# Patient Record
Sex: MALE | Race: BLACK OR AFRICAN AMERICAN | Employment: OTHER | URBAN - METROPOLITAN AREA
[De-identification: names, ages, dates, MRNs, and addresses within clinical notes are randomized per-mention and may not be internally consistent; named-entity substitution may affect disease eponyms.]

---

## 2018-03-07 ENCOUNTER — ANESTHESIA EVENT (OUTPATIENT)
Dept: SURGERY | Age: 73
End: 2018-03-07
Payer: MEDICARE

## 2018-03-08 ENCOUNTER — HOSPITAL ENCOUNTER (OUTPATIENT)
Age: 73
Setting detail: OUTPATIENT SURGERY
Discharge: HOME | End: 2018-03-08
Attending: UROLOGY | Admitting: UROLOGY
Payer: MEDICARE

## 2018-03-08 ENCOUNTER — ANESTHESIA (OUTPATIENT)
Dept: SURGERY | Age: 73
End: 2018-03-08
Payer: MEDICARE

## 2018-03-08 VITALS
OXYGEN SATURATION: 97 % | SYSTOLIC BLOOD PRESSURE: 149 MMHG | TEMPERATURE: 98.2 F | HEIGHT: 70 IN | BODY MASS INDEX: 32.21 KG/M2 | WEIGHT: 225 LBS | RESPIRATION RATE: 20 BRPM | HEART RATE: 72 BPM | DIASTOLIC BLOOD PRESSURE: 88 MMHG

## 2018-03-08 LAB
GLUCOSE BLD STRIP.AUTO-MCNC: 137 MG/DL (ref 70–110)
GLUCOSE BLD STRIP.AUTO-MCNC: 140 MG/DL (ref 70–110)

## 2018-03-08 PROCEDURE — 74011250636 HC RX REV CODE- 250/636: Performed by: NURSE ANESTHETIST, CERTIFIED REGISTERED

## 2018-03-08 PROCEDURE — 76060000034 HC ANESTHESIA 1.5 TO 2 HR: Performed by: UROLOGY

## 2018-03-08 PROCEDURE — 74011250637 HC RX REV CODE- 250/637: Performed by: NURSE ANESTHETIST, CERTIFIED REGISTERED

## 2018-03-08 PROCEDURE — 76210000006 HC OR PH I REC 0.5 TO 1 HR: Performed by: UROLOGY

## 2018-03-08 PROCEDURE — 77030010545: Performed by: UROLOGY

## 2018-03-08 PROCEDURE — 74011250636 HC RX REV CODE- 250/636

## 2018-03-08 PROCEDURE — 77030012884 HC SLV COMPR SCD MTEK -B: Performed by: UROLOGY

## 2018-03-08 PROCEDURE — 76010000162 HC OR TIME 1.5 TO 2 HR INTENSV-TIER 1: Performed by: UROLOGY

## 2018-03-08 PROCEDURE — 88305 TISSUE EXAM BY PATHOLOGIST: CPT | Performed by: UROLOGY

## 2018-03-08 PROCEDURE — 82962 GLUCOSE BLOOD TEST: CPT

## 2018-03-08 PROCEDURE — 77030020849 HC CATH URETH FOL 3 WAY  BARD -B: Performed by: UROLOGY

## 2018-03-08 PROCEDURE — 77030018836 HC SOL IRR NACL ICUM -A: Performed by: UROLOGY

## 2018-03-08 PROCEDURE — 76210000021 HC REC RM PH II 0.5 TO 1 HR: Performed by: UROLOGY

## 2018-03-08 PROCEDURE — 77030013079 HC BLNKT BAIR HGGR 3M -A: Performed by: ANESTHESIOLOGY

## 2018-03-08 PROCEDURE — 74011250636 HC RX REV CODE- 250/636: Performed by: UROLOGY

## 2018-03-08 PROCEDURE — C1782 MORCELLATOR: HCPCS | Performed by: UROLOGY

## 2018-03-08 PROCEDURE — 77030018846 HC SOL IRR STRL H20 ICUM -A: Performed by: UROLOGY

## 2018-03-08 PROCEDURE — 74011000250 HC RX REV CODE- 250

## 2018-03-08 PROCEDURE — 77030012510 HC MSK AIRWY LMA TELE -B: Performed by: ANESTHESIOLOGY

## 2018-03-08 RX ORDER — HYDROCODONE BITARTRATE AND ACETAMINOPHEN 5; 325 MG/1; MG/1
1 TABLET ORAL ONCE
Status: DISCONTINUED | OUTPATIENT
Start: 2018-03-08 | End: 2018-03-08 | Stop reason: HOSPADM

## 2018-03-08 RX ORDER — PROPOFOL 10 MG/ML
INJECTION, EMULSION INTRAVENOUS AS NEEDED
Status: DISCONTINUED | OUTPATIENT
Start: 2018-03-08 | End: 2018-03-08 | Stop reason: HOSPADM

## 2018-03-08 RX ORDER — MIDAZOLAM HYDROCHLORIDE 1 MG/ML
INJECTION, SOLUTION INTRAMUSCULAR; INTRAVENOUS AS NEEDED
Status: DISCONTINUED | OUTPATIENT
Start: 2018-03-08 | End: 2018-03-08 | Stop reason: HOSPADM

## 2018-03-08 RX ORDER — SODIUM CHLORIDE, SODIUM LACTATE, POTASSIUM CHLORIDE, CALCIUM CHLORIDE 600; 310; 30; 20 MG/100ML; MG/100ML; MG/100ML; MG/100ML
75 INJECTION, SOLUTION INTRAVENOUS CONTINUOUS
Status: DISCONTINUED | OUTPATIENT
Start: 2018-03-08 | End: 2018-03-08 | Stop reason: HOSPADM

## 2018-03-08 RX ORDER — OXYCODONE AND ACETAMINOPHEN 5; 325 MG/1; MG/1
1-2 TABLET ORAL
Qty: 30 TAB | Refills: 0 | Status: SHIPPED | OUTPATIENT
Start: 2018-03-08 | End: 2018-06-01

## 2018-03-08 RX ORDER — DEXTROSE 50 % IN WATER (D50W) INTRAVENOUS SYRINGE
25-50 AS NEEDED
Status: DISCONTINUED | OUTPATIENT
Start: 2018-03-08 | End: 2018-03-08 | Stop reason: HOSPADM

## 2018-03-08 RX ORDER — MAGNESIUM SULFATE 100 %
4 CRYSTALS MISCELLANEOUS AS NEEDED
Status: DISCONTINUED | OUTPATIENT
Start: 2018-03-08 | End: 2018-03-08 | Stop reason: HOSPADM

## 2018-03-08 RX ORDER — HYDROMORPHONE HYDROCHLORIDE 2 MG/ML
0.5 INJECTION, SOLUTION INTRAMUSCULAR; INTRAVENOUS; SUBCUTANEOUS
Status: DISCONTINUED | OUTPATIENT
Start: 2018-03-08 | End: 2018-03-08 | Stop reason: HOSPADM

## 2018-03-08 RX ORDER — CEFAZOLIN SODIUM 2 G/50ML
2 SOLUTION INTRAVENOUS
Status: COMPLETED | OUTPATIENT
Start: 2018-03-08 | End: 2018-03-08

## 2018-03-08 RX ORDER — ONDANSETRON 2 MG/ML
INJECTION INTRAMUSCULAR; INTRAVENOUS AS NEEDED
Status: DISCONTINUED | OUTPATIENT
Start: 2018-03-08 | End: 2018-03-08 | Stop reason: HOSPADM

## 2018-03-08 RX ORDER — FENTANYL CITRATE 50 UG/ML
50 INJECTION, SOLUTION INTRAMUSCULAR; INTRAVENOUS AS NEEDED
Status: DISCONTINUED | OUTPATIENT
Start: 2018-03-08 | End: 2018-03-08 | Stop reason: HOSPADM

## 2018-03-08 RX ORDER — DOCUSATE SODIUM 100 MG/1
100 CAPSULE, LIQUID FILLED ORAL
Qty: 60 CAP | Refills: 2 | Status: SHIPPED | OUTPATIENT
Start: 2018-03-08 | End: 2018-06-01

## 2018-03-08 RX ORDER — LIDOCAINE HYDROCHLORIDE 10 MG/ML
0.1 INJECTION INFILTRATION; PERINEURAL AS NEEDED
Status: DISCONTINUED | OUTPATIENT
Start: 2018-03-08 | End: 2018-03-08 | Stop reason: HOSPADM

## 2018-03-08 RX ORDER — FAMOTIDINE 20 MG/1
20 TABLET, FILM COATED ORAL ONCE
Status: COMPLETED | OUTPATIENT
Start: 2018-03-08 | End: 2018-03-08

## 2018-03-08 RX ORDER — FENTANYL CITRATE 50 UG/ML
INJECTION, SOLUTION INTRAMUSCULAR; INTRAVENOUS AS NEEDED
Status: DISCONTINUED | OUTPATIENT
Start: 2018-03-08 | End: 2018-03-08 | Stop reason: HOSPADM

## 2018-03-08 RX ORDER — DIPHENHYDRAMINE HYDROCHLORIDE 50 MG/ML
25 INJECTION, SOLUTION INTRAMUSCULAR; INTRAVENOUS
Status: DISCONTINUED | OUTPATIENT
Start: 2018-03-08 | End: 2018-03-08 | Stop reason: HOSPADM

## 2018-03-08 RX ORDER — CIPROFLOXACIN 500 MG/1
500 TABLET ORAL 2 TIMES DAILY
Qty: 6 TAB | Refills: 0 | Status: SHIPPED | OUTPATIENT
Start: 2018-03-08 | End: 2018-06-01

## 2018-03-08 RX ORDER — INSULIN LISPRO 100 [IU]/ML
INJECTION, SOLUTION INTRAVENOUS; SUBCUTANEOUS ONCE
Status: DISCONTINUED | OUTPATIENT
Start: 2018-03-08 | End: 2018-03-08 | Stop reason: HOSPADM

## 2018-03-08 RX ORDER — LIDOCAINE HYDROCHLORIDE 20 MG/ML
INJECTION, SOLUTION EPIDURAL; INFILTRATION; INTRACAUDAL; PERINEURAL AS NEEDED
Status: DISCONTINUED | OUTPATIENT
Start: 2018-03-08 | End: 2018-03-08 | Stop reason: HOSPADM

## 2018-03-08 RX ORDER — DEXAMETHASONE SODIUM PHOSPHATE 4 MG/ML
INJECTION, SOLUTION INTRA-ARTICULAR; INTRALESIONAL; INTRAMUSCULAR; INTRAVENOUS; SOFT TISSUE AS NEEDED
Status: DISCONTINUED | OUTPATIENT
Start: 2018-03-08 | End: 2018-03-08 | Stop reason: HOSPADM

## 2018-03-08 RX ADMIN — DEXAMETHASONE SODIUM PHOSPHATE 4 MG: 4 INJECTION, SOLUTION INTRA-ARTICULAR; INTRALESIONAL; INTRAMUSCULAR; INTRAVENOUS; SOFT TISSUE at 08:53

## 2018-03-08 RX ADMIN — FENTANYL CITRATE 50 MCG: 50 INJECTION, SOLUTION INTRAMUSCULAR; INTRAVENOUS at 09:23

## 2018-03-08 RX ADMIN — PROPOFOL 150 MG: 10 INJECTION, EMULSION INTRAVENOUS at 08:44

## 2018-03-08 RX ADMIN — ONDANSETRON 4 MG: 2 INJECTION INTRAMUSCULAR; INTRAVENOUS at 10:00

## 2018-03-08 RX ADMIN — CEFAZOLIN SODIUM 2 G: 2 SOLUTION INTRAVENOUS at 08:44

## 2018-03-08 RX ADMIN — LIDOCAINE HYDROCHLORIDE 100 MG: 20 INJECTION, SOLUTION EPIDURAL; INFILTRATION; INTRACAUDAL; PERINEURAL at 08:44

## 2018-03-08 RX ADMIN — FAMOTIDINE 20 MG: 20 TABLET, FILM COATED ORAL at 07:30

## 2018-03-08 RX ADMIN — FENTANYL CITRATE 50 MCG: 50 INJECTION, SOLUTION INTRAMUSCULAR; INTRAVENOUS at 10:00

## 2018-03-08 RX ADMIN — MIDAZOLAM HYDROCHLORIDE 2 MG: 1 INJECTION, SOLUTION INTRAMUSCULAR; INTRAVENOUS at 08:28

## 2018-03-08 RX ADMIN — SODIUM CHLORIDE, SODIUM LACTATE, POTASSIUM CHLORIDE, AND CALCIUM CHLORIDE 75 ML/HR: 600; 310; 30; 20 INJECTION, SOLUTION INTRAVENOUS at 07:26

## 2018-03-08 RX ADMIN — FENTANYL CITRATE 100 MCG: 50 INJECTION, SOLUTION INTRAMUSCULAR; INTRAVENOUS at 08:36

## 2018-03-08 NOTE — DISCHARGE INSTRUCTIONS
Follow up 3/12/18 at 10:10am for Bowman catheter removal      DISCHARGE SUMMARY from Nurse    PATIENT INSTRUCTIONS:    After general anesthesia or intravenous sedation, for 24 hours or while taking prescription Narcotics:  · Limit your activities  · Do not drive and operate hazardous machinery  · Do not make important personal or business decisions  · Do  not drink alcoholic beverages  · If you have not urinated within 8 hours after discharge, please contact your surgeon on call. Report the following to your surgeon:  · Excessive pain, swelling, redness or odor of or around the surgical area  · Temperature over 100.5  · Nausea and vomiting lasting longer than 4 hours or if unable to take medications  · Any signs of decreased circulation or nerve impairment to extremity: change in color, persistent  numbness, tingling, coldness or increase pain  · Any questions    What to do at Home:  Recommended activity: Activity as tolerated and no driving for today. These are general instructions for a healthy lifestyle:    No smoking/ No tobacco products/ Avoid exposure to second hand smoke  Surgeon General's Warning:  Quitting smoking now greatly reduces serious risk to your health. Obesity, smoking, and sedentary lifestyle greatly increases your risk for illness    A healthy diet, regular physical exercise & weight monitoring are important for maintaining a healthy lifestyle    You may be retaining fluid if you have a history of heart failure or if you experience any of the following symptoms:  Weight gain of 3 pounds or more overnight or 5 pounds in a week, increased swelling in our hands or feet or shortness of breath while lying flat in bed. Please call your doctor as soon as you notice any of these symptoms; do not wait until your next office visit.     Recognize signs and symptoms of STROKE:    F-face looks uneven    A-arms unable to move or move unevenly    S-speech slurred or non-existent    T-time-call 911 as soon as signs and symptoms begin-DO NOT go       Back to bed or wait to see if you get better-TIME IS BRAIN. Warning Signs of HEART ATTACK     Call 911 if you have these symptoms:   Chest discomfort. Most heart attacks involve discomfort in the center of the chest that lasts more than a few minutes, or that goes away and comes back. It can feel like uncomfortable pressure, squeezing, fullness, or pain.  Discomfort in other areas of the upper body. Symptoms can include pain or discomfort in one or both arms, the back, neck, jaw, or stomach.  Shortness of breath with or without chest discomfort.  Other signs may include breaking out in a cold sweat, nausea, or lightheadedness. Don't wait more than five minutes to call 911 - MINUTES MATTER! Fast action can save your life. Calling 911 is almost always the fastest way to get lifesaving treatment. Emergency Medical Services staff can begin treatment when they arrive -- up to an hour sooner than if someone gets to the hospital by car. The discharge information has been reviewed with the patient. The patient verbalized understanding. Discharge medications reviewed with the patient and appropriate educational materials and side effects teaching were provided. _________  Patient armband removed and given to patient to take home.   Patient was informed of the privacy risks if armband lost or stolen  __________________________________________________________________________________________________________________________

## 2018-03-08 NOTE — IP AVS SNAPSHOT
[facility] 
 
 
 [address] 
[phone] Patient: Sushma Murray MRN: [id number] :1945 About your hospitalization You were [facility] on:  [facility] You were discharged on:  2018 Why you were hospitalized Your primary diagnosis was:  Not on File Follow-up Information Follow up With Details Comments Contact Info MD Kwame Quiroz [id number] [place] [address] 
[phone] Your Scheduled Appointments 2018 10:10 AM EDT Nurse Visit with [facility] ([address]) [address], [place] [address] [facility] [id number] [phone] 2018  2:45 PM EDT  
ESTABLISHED PATIENT with Marcio Emanuel MD  
Urology of [address]) [address], [place] [address] [facility] [address]  
[phone] Discharge Orders None A check ariadna indicates which time of day the medication should be taken. My Medications START taking these medications Instructions Each Dose to Equal  
 Morning Noon Evening Bedtime  
 ciprofloxacin HCl 500 mg tablet Commonly known as:  CIPRO Your last dose was: Your next dose is: Take 1 Tab by mouth two (2) times a day. 500 mg  
    
   
   
   
  
 docusate sodium 100 mg capsule Commonly known as:  Arlean Lavender Your last dose was: Your next dose is: Take 1 Cap by mouth two (2) times daily as needed for Constipation for up to 90 days. 100 mg  
    
   
   
   
  
 oxyCODONE-acetaminophen 5-325 mg per tablet Commonly known as:  PERCOCET Your last dose was: Your next dose is: Take 1-2 Tabs by mouth every four (4) hours as needed for Pain. Max Daily Amount: 12 Tabs. 1-2 Tab CONTINUE taking these medications Instructions Each Dose to Equal  
 Morning Noon Evening Bedtime  
 aspirin delayed-release 81 mg tablet Your last dose was: Your next dose is: Take 81 mg by mouth daily. 81 mg  
    
   
   
   
  
 atorvastatin 10 mg tablet Commonly known as:  LIPITOR Your last dose was: Your next dose is: Take 10 mg by mouth daily. Indications: hyperlipidemia 10 mg  
    
   
   
   
  
 finasteride 5 mg tablet Commonly known as:  PROSCAR Your last dose was: Your next dose is: Take 5 mg by mouth daily. Indications: SYMPTOMATIC BENIGN PROSTATIC HYPERPLASIA  
 5 mg FLONASE SENSIMIST NA Your last dose was: Your next dose is:    
   
   
 by Nasal route. lisinopril 2.5 mg tablet Commonly known as:  Nathalie Grebe Your last dose was: Your next dose is: Take 2.5 mg by mouth daily. 2.5 mg  
    
   
   
   
  
 metFORMIN 500 mg tablet Commonly known as:  GLUCOPHAGE Your last dose was: Your next dose is: Take 500 mg by mouth two (2) times daily (with meals). Indications: type 2 diabetes mellitus 500 mg  
    
   
   
   
  
 MUCINEX PO Your last dose was: Your next dose is: Take  by mouth. NexIUM 20 mg capsule Generic drug:  esomeprazole Your last dose was: Your next dose is: Take  by mouth daily. Indications: gastroesophageal reflux disease OTHER Your last dose was: Your next dose is:    
   
   
      
   
   
   
  
 tamsulosin 0.4 mg capsule Commonly known as:  FLOMAX Your last dose was: Your next dose is: Take 0.4 mg by mouth daily.  Indications: SYMPTOMATIC BENIGN PROSTATIC HYPERPLASIA  
 0.4 mg  
 TYLENOL SINUS CONGESTION PAIN 5-325 mg Tab Generic drug:  phenylephrine-acetaminophen Your last dose was: Your next dose is: Take  by mouth. Where to Get Your Medications Information on where to get these meds will be given to you by the nurse or doctor. ! Ask your nurse or doctor about these medications  
  ciprofloxacin HCl 500 mg tablet  
 docusate sodium 100 mg capsule  
 oxyCODONE-acetaminophen 5-325 mg per tablet Discharge Instructions Follow up 3/12/18 at 10:10am for Bowman catheter removal 
 
 
DISCHARGE SUMMARY from Nurse PATIENT INSTRUCTIONS: 
 
After general anesthesia or intravenous sedation, for 24 hours or while taking prescription Narcotics: · Limit your activities · Do not drive and operate hazardous machinery · Do not make important personal or business decisions · Do  not drink alcoholic beverages · If you have not urinated within 8 hours after discharge, please contact your surgeon on call. Report the following to your surgeon: 
· Excessive pain, swelling, redness or odor of or around the surgical area · Temperature over 100.5 · Nausea and vomiting lasting longer than 4 hours or if unable to take medications · Any signs of decreased circulation or nerve impairment to extremity: change in color, persistent  numbness, tingling, coldness or increase pain · Any questions What to do at Home: 
Recommended activity: Activity as tolerated and no driving for today. These are general instructions for a healthy lifestyle: No smoking/ No tobacco products/ Avoid exposure to second hand smoke Surgeon General's Warning:  Quitting smoking now greatly reduces serious risk to your health. Obesity, smoking, and sedentary lifestyle greatly increases your risk for illness A healthy diet, regular physical exercise & weight monitoring are important for maintaining a healthy lifestyle You may be retaining fluid if you have a history of heart failure or if you experience any of the following symptoms:  Weight gain of 3 pounds or more overnight or 5 pounds in a week, increased swelling in our hands or feet or shortness of breath while lying flat in bed. Please call your doctor as soon as you notice any of these symptoms; do not wait until your next office visit. Recognize signs and symptoms of STROKE: 
 
F-face looks uneven A-arms unable to move or move unevenly S-speech slurred or non-existent T-time-call 911 as soon as signs and symptoms begin-DO NOT go Back to bed or wait to see if you get better-TIME IS BRAIN. Warning Signs of HEART ATTACK Call 911 if you have these symptoms: 
? Chest discomfort. Most heart attacks involve discomfort in the center of the chest that lasts more than a few minutes, or that goes away and comes back. It can feel like uncomfortable pressure, squeezing, fullness, or pain. ? Discomfort in other areas of the upper body. Symptoms can include pain or discomfort in one or both arms, the back, neck, jaw, or stomach. ? Shortness of breath with or without chest discomfort. ? Other signs may include breaking out in a cold sweat, nausea, or lightheadedness. Don't wait more than five minutes to call [address]! Fast action can save your life. Calling 911 is almost always the fastest way to get lifesaving treatment. Emergency Medical Services staff can begin treatment when they arrive  up to an hour sooner than if someone gets to the hospital by car. The discharge information has been reviewed with the patient. The patient verbalized understanding. Discharge medications reviewed with the patient and appropriate educational materials and side effects teaching were provided. _________ Patient armband removed and given to patient to take home.   Patient was informed of the privacy risks if armband lost or stolen 
__________________________________________________________________________________________________________________________ Introducing [address]! [facility] introduces [employer] patient portal. Now you can access parts of your medical record, email your doctor's office, and request medication refills online. 1. In your internet browser, go to [email or web address] 2. Click on the First Time User? Click Here link in the Sign In box. You will see the New Member Sign Up page. 3. Enter your [employer] Access Code exactly as it appears below. You will not need to use this code after youve completed the sign-up process. If you do not sign up before the expiration date, you must request a new code. · [employer] Access Code: [email or web address] Expires: 5/9/2018  5:40 AM 
 
4. Enter the last four digits of your Social Security Number (xxxx) and Date of Birth (mm/dd/yyyy) as indicated and click Submit. You will be taken to the next sign-up page. 5. Create a [employer] ID. This will be your [employer] login ID and cannot be changed, so think of one that is secure and easy to remember. 6. Create a [employer] password. You can change your password at any time. 7. Enter your Password Reset Question and Answer. This can be used at a later time if you forget your password. 8. Enter your e-mail address. You will receive e-mail notification when new information is available in [address] 9. Click Sign Up. You can now view and download portions of your medical record. 10. Click the Download Summary menu link to download a portable copy of your medical information. If you have questions, please visit the Frequently Asked Questions section of the [employer] website. Remember, [employer] is NOT to be used for urgent needs. For medical emergencies, dial 911. Now available from your iPhone and Android! Providers Seen During Your Hospitalization Provider Specialty Primary office phone Rodell Olszewski, MD Urology [phone] Your Primary Care Physician (PCP) Primary Care Physician Office Phone Office [place] [address] [phone] You are allergic to the following No active allergies Recent Documentation Height Weight BMI Smoking Status 1.778 m 102.1 kg 32.28 kg/m2 Former Smoker Emergency Contacts Name Discharge Info Relation Home Work Mobile Carmelina Mills DISCHARGE CAREGIVER [3] Spouse [3]   [phone] Patient Belongings The following personal items are in your possession at time of discharge: 
  Dental Appliances: None  Visual Aid: Glasses      Home Medications: None   Jewelry: None  Clothing: Shirt, Socks, Undergarments, Pants, Jacket/Coat, Footwear, Sweater    Other Valuables: None Please provide this summary of care documentation to your next provider. Signatures-by signing, you are acknowledging that this After Visit Summary has been reviewed with you and you have received a copy. Patient Signature:  ____________________________________________________________ Date:  ____________________________________________________________  
  
Macie Sleeper Provider Signature:  ____________________________________________________________ Date:  ____________________________________________________________

## 2018-03-08 NOTE — ANESTHESIA PREPROCEDURE EVALUATION
Anesthetic History   No history of anesthetic complications            Review of Systems / Medical History  Patient summary reviewed and pertinent labs reviewed    Pulmonary        Sleep apnea: CPAP           Neuro/Psych   Within defined limits           Cardiovascular                       GI/Hepatic/Renal     GERD           Endo/Other    Diabetes: type 2    Obesity and arthritis     Other Findings   Comments: Documentation of current medication  Current medications obtained, documented and obtained? YES      Risk Factors for Postoperative nausea/vomiting:       History of postoperative nausea/vomiting? NO       Female? NO       Motion sickness? NO       Intended opioid administration for postoperative analgesia? YES      Smoking Abstinence:  Current Smoker? NO  Elective Surgery? YES  Seen preoperatively by anesthesiologist or proxy prior to day of surgery? YES  Pt abstained from smoking 24 hours prior to anesthesia?  N/A    Preventive care/screening for High Blood Pressure:  Aged 18 years and older: YES  Screened for high blood pressure: YES  Patients with high blood pressure referred to primary care provider   for BP management: YES               Physical Exam    Airway  Mallampati: III  TM Distance: 4 - 6 cm  Neck ROM: normal range of motion   Mouth opening: Diminished (comment)     Cardiovascular    Rhythm: irregular  Rate: normal         Dental    Dentition: Poor dentition  Comments: Chipped tooth as shown   Pulmonary  Breath sounds clear to auscultation               Abdominal  GI exam deferred       Other Findings            Anesthetic Plan    ASA: 3  Anesthesia type: general          Induction: Intravenous  Anesthetic plan and risks discussed with: Patient

## 2018-03-08 NOTE — H&P
ASSESSMENT:   1. Malignant neoplasm of urinary bladder, unspecified site ([facility] [address]    2. Benign prostatic hyperplasia with lower urinary tract symptoms, symptom details unspecified       Assessment:  1. 67 y.o. male with history of recurrent urothelial carcinoma of the bladder (history incomplete due to lack of records - no staging).     Recent Imaging: CT A/P on 11/21/17 showed mass effect on the urinary bladder from the enlarged and heterogeneous prostate; irregular areas of thickening along the urinary bladder wall may be post surgical or related to chronic outlet obstruction from the prostate. CXR 11/21/17 showed there is bibasilar atelectatic change; lungs are otherwise clear without focal infiltrate. Last Creatinine: 0.92 on 10/12/15  Last Cystoscopy: today 12/8/17 without evidence of recurrence.     First Tumor: unknown  TURBT History: recurrence in 2010, 9/2012 with [facility] (last recurrence)  Intravesicle Therapy: unknown     Current Disease Status:  Stable, CHANCE  Current Treatment Plan: repeat cysto in one year     2. BPH. S/p TUMT on 11/20/13. On Flomax and Proscar with little benefit.     3. PSA screening PSA 4/13/16 2.27     Plan:  · Urine cytology reviewed  · CTU reviewed - will refer to GI for fatty liver and Indeterminate liver lesion along the falciform ligament (suspect pseudoenhancement of falciform)  · Cysto today revealed large obstructing median lobe. Patient also with significant urinary symptoms on Flomax and finasteride.   · Therefore, will schedule ProTouch laser enucleation of prostate at next available appointment.      We discussed the options for management of LUTS/Benign Prostatic Hyperplasia;  including watchful waiting, over the counter supplements, medical therapy, minimally invasive therapies, laser therapy and transurethral resection/coagulative therapies.       Watchful Waiting: risks and benefits of this approach were discussed including the fact that BPH is usually a progressive disease and can lead to infection, bladder stones, hematuria, incontinence, bladder damage and kidney damage but not limited to these conditions. The need for on-going monitoring, including PSA, KAN, Flow, Bladder scan, and prostate ultrasound.      Alternative therapies: risks and benefits of medical therapy using saw palmetto, prostate formulas and various over the counter therapies.     Minimally Invasive Therapies: risks and benefits of various therapies including but not limited to TransUrethral Microwave Therapy (TUMT), Injection therapies and others.     Laser Therapies: risks and benefits of GreenLight Laser Photoselective Vaporization of the Prostate (PVP), Holmium Laser Ablation of the Prostate (HoLAP), and Holmium Laser Enucleation of the Prostate (HoLEP).    Transurethral resection/Coagulative Therapies and PlasmaButton procedure: risks and benefits of these therapies reviewed as well.      The risks and benefits of each option were reviewed in great detail. All questions answered and the patent desires surgical management.                     Chief Complaint   Patient presents with    Bladder Cancer         HISTORY OF PRESENT ILLNESS:  Chadwick Bernheim is a 67 y.o. male who is seen in consultation as referred by Dr. Levi Cristina MD for history of bladder cancer.     Patient reports that he was diagnosed with bladder cancer in 2009 or 2010 in [place], [place] at [facility]. He had gross hematuria prior to diagnosis and says that he underwent 3 operations for his bladder cancer. He notes that he moved to [place], [place] following his bladder cancer treatment and was followed by Urologist Dr. Carrie Ramirez in [place]. He recently moved to [place] and is looking to establish care for monitoring his history of bladder cancer. States he does not think he have intravesical therapy. States last cysto over a year ago. No recent gross hematuria      He has been taking Flomax 0.4 mg daily with benefit since 9/19/09. He has also been taking Proscar 5 mg daily since 4/27/17. He denies any bothersome urinary complaints at this time. However, AUA SS today (11/8/17) is 22 (3), noting the following symptoms: nocturia x3, intermittency, straining, and weak FOS. He is satisfied with his current therapy.      He denies any gross hematuria, dysuria, n/v/f/c. No flank pain, back pain, or bone pain.      Stable weight and appetite. No changes in energy level.              Past Medical History:   Diagnosis Date    [facility]      BPH (benign prostatic hyperplasia)      Diabetes ([facility] 04/27/2017    Environmental allergies      GERD (gastroesophageal reflux disease)      Hyperlipidemia      Sleep apnea       CPAP         History reviewed. No pertinent surgical history.             Social History   Substance Use Topics    Smoking status: Former Smoker       Packs/day: 0.25       Years: 2.00       Quit date: 5    Smokeless tobacco: Never Used    Alcohol use Yes          Comment: Once or twice a year         No Known Allergies           Family History   Problem Relation Age of Onset    Diabetes Mother                  Current Outpatient Prescriptions   Medication Sig Dispense Refill    finasteride (PROSCAR) 5 mg tablet Take 5 mg by mouth daily. Indications: SYMPTOMATIC BENIGN PROSTATIC HYPERPLASIA        tamsulosin (FLOMAX) 0.4 mg capsule Take 0.4 mg by mouth daily. Indications: SYMPTOMATIC BENIGN PROSTATIC HYPERPLASIA        aspirin delayed-release 81 mg tablet Take 81 mg by mouth daily.        esomeprazole (NEXIUM) 20 mg capsule Take  by mouth daily. Indications: gastroesophageal reflux disease        phenylephrine-acetaminophen (TYLENOL SINUS CONGESTION PAIN) 5-325 mg tab Take  by mouth.        FLUTICASONE FUROATE (FLONASE SENSIMIST NA) by Nasal route.        atorvastatin (LIPITOR) 10 mg tablet Take 10 mg by mouth daily.  Indications: hyperlipidemia        lisinopril (PRINIVIL, ZESTRIL) 2.5 mg tablet Take 2.5 mg by mouth daily.        metFORMIN (GLUCOPHAGE) 500 mg tablet Take 500 mg by mouth two (2) times daily (with meals). Indications: type 2 diabetes mellitus             Review of Systems  Constitutional: Fever: No  Skin: Rash: No  HEENT: Hearing difficulty: No  Eyes: Blurred vision: No  Cardiovascular: Chest pain: No  Respiratory: Shortness of breath: No  Gastrointestinal: Nausea/vomiting: No  Musculoskeletal: Back pain: No  Neurological: Weakness: No  Psychological: Memory loss: No  Comments/additional findings:            PHYSICAL EXAMINATION:        Visit Vitals    /80    Ht 5' 10\" (1.778 m)    Wt 219 lb (99.3 kg)    BMI 31.42 kg/m2      Constitutional: WDWN, Pleasant and appropriate affect, No acute distress. CV:  No peripheral swelling noted  Respiratory: No respiratory distress or difficulties   Male:    KAN:Perineum normal to visual inspection, no erythema or irritation, Sphincter with good tone, Rectum with no hemorrhoids, fissures or masses, Prostate smooth, symmetric. Prostate is large, approximately 60 grams in size without nodules. SCROTUM:  No scrotal rash or lesions noticed. Normal bilateral testes and epididymis. PENIS: Urethral meatus normal in location and size. No urethral discharge. Skin: No jaundice. Neuro/Psych:  Alert and oriented x 3, affect appropriate.    Lymphatic:   No enlarged supraclavicular lymph nodes.          REVIEW OF LABS AND IMAGING:          Results for orders placed or performed in visit on 12/08/17   AMB POC URINALYSIS DIP STICK AUTO W/O MICRO   Result Value Ref Range     Color (UA POC)         Clarity (UA POC)         Glucose (UA POC) Negative Negative     Bilirubin (UA POC) Negative Negative     Ketones (UA POC) Negative Negative     Specific gravity (UA POC) 1.015 1.001 - 1.035     Blood (UA POC) Negative Negative     pH (UA POC) 5.5 4.6 - 8.0     Protein (UA POC) Negative Negative     Urobilinogen (UA POC) 0.2 mg/dL 0.2 - 1     Nitrites (UA POC) Negative Negative     Leukocyte esterase (UA POC) Negative Negative      CTU 11/16/17     IMPRESSION:  Indeterminate liver lesion along the falciform ligament with some fill in on  delayed phase images.  Ill-defined area of faint enhancement within liver  segment IV may be due to perfusion anomaly.  Heterogeneous enhancement of the  liver parenchyma may also be due to areas of focal fatty sparing. Probable left adrenal nodule adenoma. Both ureters opacify throughout their course on the delayed phase images except  at the right UVJ which may be due to phase of contrast.   Mass effect on the urinary bladder from the enlarged and heterogeneous prostate.      Irregular areas of thickening along the urinary bladder wall may be post  surgical or related to chronic outlet obstruction from the prostate.             CYTOLOGY NON GYN, [facility] [[id number]] (Order [id number])   Pathology    Date: 11/8/2017   Department: [address] [place] [address]   Ordering/Authorizing: Tye Peacock MD           Provider Information       Ordering User Ordering Provider Authorizing Provider      MD Tye Lara MD      PCP      Scout Cheek MD             11/14/2017  2:12 PM - Nasim, [facility] In        Component Results       Component      APRESULT      AP results      Comment:          CYTOLOGY REPORT       CLINICAL INFORMATION:       Previous Cytology:  No Previous Cytology       History of Bladder Carcinoma:  Yes       Bladder Cancer Treatment:  (TURBT) Transurethral Resection of Bladder       History of kidney stones  No       ------------------------------------------------------------   CYTOLOGIC DIAGNOSIS:       Negative for malignancy.  Reactive urothelial cells.       ------------------------------------------------------------         Abdominal US 4/26/17  FINDINGS:  Pancreas is partially obscured. Proximal IVC is unremarkable. Abdominal aorta demonstrates atherosclerotic calcification.   Liver demonstrates fatty infiltration. No intrahepatic bile duct dilatation. Bile duct measures 6.7 mm. No stones are seen in the gallbladder. Gallbladder wall measures 2 mm. Spleen is not enlarged, echotexture is within normal limits. Right kidney  measures 10.8 x 6 x 4.9 cm. Left kidney measures 10.9 x 6.5 x 4.8 cm. Normal corticomedullary differentiation. No hydronephrosis.   IMPRESSION:  Mild fatty infiltration of the liver.        A copy of today's office visit with all pertinent imaging results and labs were sent to the referring physician.    Medina Robin MD

## 2018-03-08 NOTE — OP NOTES
[facility]  OPERATIVE REPORT    Bhavani Hernandez  MR#: [id number]  : 1945  ACCOUNT #: [de-identified]   DATE OF SERVICE: 2018    PREOPERATIVE DIAGNOSIS:  Benign prostatic hyperplasia with bladder outlet obstruction. POSTOPERATIVE DIAGNOSIS:  Benign prostatic hyperplasia with bladder outlet obstruction. PROCEDURES PERFORMED  1. Cystoscopy. 2.  ProTouch laser enucleation of the prostate. 3.  Tissue morcellation. SURGEON:  Pineda Rao MD.     ASSISTANT:  Faustino Queen. ANESTHESIA:  General.    FLUIDS:  Crystalloid. ESTIMATED BLOOD LOSS:  Minimal.    SPECIMENS REMOVED:  Prostate chips for analysis. DRAINS:  A 20-Maltese 2-way Bowman catheter with 30 mL of sterile water in the balloon. COMPLICATIONS:  none. IMPLANTS: none    INTRAOPERATIVE FINDINGS  1. On cystourethroscopy, there were no bladder lesions. Ureteral orifices were in orthotopic position. 2.  Prostate was noted to have a large median lobe with large obstructing lateral lobes. At the end of the case, after the bladder was decompressed, a good channel was noted and hemostasis was assured. INDICATION FOR THE PROCEDURE:  The patient is a 59-year-old male with a history of bladder outlet obstruction secondary to benign prostatic hyperplasia. The patient had previously undergone a minimally invasive procedure and was currently on maximal medical therapy. All risks, benefits and alternatives were explained to the patient and he decided to proceed. DETAILS OF THE PROCEDURE:  The patient was identified in the holding area and taken back to the operating room. Perioperative antibiotics were given, sequential compression devices placed. Anesthesia was induced. The patient was placed in dorsal lithotomy position, taking care to pad all pressure points. The patient prepped and draped in the usual sterile fashion. Time out was performed.     First, a 25 Maltese rigid cystoscope was inserted into the patient's bladder. Systematic cystoscopy with 30 and 70 degree lenses was performed. At this point, the scope was removed and the patient's urethra was dilated to [address] [place] with sequential [employer] sounds. Using the visualizing obturator, continuous flow resectoscope was inserted into the patient's bladder. The working element was inserted and using the Diode laser, we made incisions at 5 and 7 o'clock at the bladder neck, coming down to the level of the verumontanum. At this point, we, in a retrograde fashion, enucleated the median lobe back to the level of the bladder neck. Once the median lobe had been enucleated, we then extended our 5 o'clock incision around the apex of the prostate. Then, we made a 1 o'clock incision at the bladder neck, carrying it around to meet our 5 o'clock incision. We then enucleated the left lateral lobe in a retrograde fashion. Once this was done, we proceeded to our 7 o'clock incision around the apex of the prostate. Once this was done, we started at the bladder neck at 11:00 and carried this down to the level of the apex. We then, in a retrograde fashion, enucleated the prostate. Once all 3 lobes were in the bladder, we inserted the offset nephroscope and the [facility] morcellator. All chips were completely morcellated. Once this was done, the working element was replaced and we assured hemostasis with the Diode laser after decompressing the bladder. Once this was done, a 20-Kinyarwanda 2-way Bowman catheter was placed. The catheter was irrigated and the urine was clear. At this point, the case came to a conclusion. The patient was awoken from anesthesia and taken back to PACU in stable condition. Of note, I was scrubbed and present for all critical portions of the case.       Dori Palacios MD       [address] / Jonathan Haney  D: 03/08/2018 10:11     T: 03/08/2018 10:40  JOB #: [id number]

## 2018-03-08 NOTE — PERIOP NOTES
1013 Pt received to PACU and connected to monitor. Vital signs stable. Nurse at bedside. Will continue to monitor. [facility] pt's family to update on current status.

## 2018-03-08 NOTE — BRIEF OP NOTE
BRIEF OPERATIVE NOTE    Date of Procedure: 3/8/2018   Preoperative Diagnosis: benign prostatic hyperplasia lower urinary tract symptoms symptom details unspecified  Postoperative Diagnosis: benign prostatic hyperplasia lower urinary tract symptoms symptom details unspecified    Procedure(s):  PROTOUCH LASER ENNUCLEATION OF THE PROSTATE  Surgeon(s) and Role:     * Altaf Drake MD - Primary         Assistant Staff: None      Surgical Staff:  Circ-1: Eugenio Mcclellan RN  Scrub Tech-1: Bo Faulkner  Event Time In   Incision Start  8:52 AM   Incision Close 10:11 AM     Anesthesia: General   Estimated Blood Loss: minimal  Specimens:   ID Type Source Tests Collected by Time Destination   1 : Francesca Plasencia MD 3/8/2018  9:21 AM Pathology      Findings: see dictation   Complications: none  Implants: * No implants in log *

## 2018-03-08 NOTE — ANESTHESIA POSTPROCEDURE EVALUATION
Post-Anesthesia Evaluation and Assessment    Patient: Amara Obrien MRN: [id number]  SSN: [id number]    YOB: 1945  Age: 67 y.o. Sex: male       Cardiovascular Function/Vital Signs  Visit Vitals    /83    Pulse 70    Temp 36.8 °C (98.2 °F)    Resp 18    Ht 5' 10\" (1.778 m)    Wt 102.1 kg (225 lb)    SpO2 96%    BMI 32.28 kg/m2       Patient is status post general anesthesia for Procedure(s):  [facility]. Nausea/Vomiting: None    Postoperative hydration reviewed and adequate. Pain:  Pain Scale 1: Visual (03/08/18 1013)  Pain Intensity 1: 0 (03/08/18 1013)   Managed    Neurological Status:   Neuro (WDL): Exceptions to WDL (03/08/18 1013)  Neuro  Neurologic State: Anesthetized;Sleeping (03/08/18 1013)   At baseline    Mental Status and Level of Consciousness: Alert and oriented     Pulmonary Status:   O2 Device: Oral airway; Oxygen mask (03/08/18 1019)   Adequate oxygenation and airway patent    Complications related to anesthesia: None    Post-anesthesia assessment completed.  No concerns    Signed By: Alexa Charles CRNA     March 8, 2018

## 2018-11-05 ENCOUNTER — HOSPITAL ENCOUNTER (EMERGENCY)
Age: 73
Discharge: HOME | End: 2018-11-05
Attending: EMERGENCY MEDICINE
Payer: MEDICARE

## 2018-11-05 ENCOUNTER — APPOINTMENT (OUTPATIENT)
Dept: GENERAL RADIOLOGY | Age: 73
End: 2018-11-05
Attending: EMERGENCY MEDICINE
Payer: MEDICARE

## 2018-11-05 VITALS
HEIGHT: 71 IN | SYSTOLIC BLOOD PRESSURE: 145 MMHG | TEMPERATURE: 98 F | HEART RATE: 90 BPM | OXYGEN SATURATION: 96 % | RESPIRATION RATE: 18 BRPM | BODY MASS INDEX: 30.1 KG/M2 | DIASTOLIC BLOOD PRESSURE: 89 MMHG | WEIGHT: 215 LBS

## 2018-11-05 DIAGNOSIS — J06.9 UPPER RESPIRATORY TRACT INFECTION, UNSPECIFIED TYPE: Primary | ICD-10-CM

## 2018-11-05 LAB
ALBUMIN SERPL-MCNC: 3.9 G/DL (ref 3.4–5)
ALBUMIN/GLOB SERPL: 1.1 {RATIO} (ref 0.8–1.7)
ALP SERPL-CCNC: 61 U/L (ref 45–117)
ALT SERPL-CCNC: 45 U/L (ref 16–61)
ANION GAP SERPL CALC-SCNC: 9 MMOL/L (ref 3–18)
AST SERPL-CCNC: 29 U/L (ref 15–37)
BASOPHILS # BLD: 0 K/UL (ref 0–0.1)
BASOPHILS NFR BLD: 1 % (ref 0–2)
BILIRUB SERPL-MCNC: 0.4 MG/DL (ref 0.2–1)
BUN SERPL-MCNC: 15 MG/DL (ref 7–18)
BUN/CREAT SERPL: 11 (ref 12–20)
CALCIUM SERPL-MCNC: 8.5 MG/DL (ref 8.5–10.1)
CHLORIDE SERPL-SCNC: 106 MMOL/L (ref 100–108)
CO2 SERPL-SCNC: 27 MMOL/L (ref 21–32)
CREAT SERPL-MCNC: 1.38 MG/DL (ref 0.6–1.3)
DIFFERENTIAL METHOD BLD: ABNORMAL
EOSINOPHIL # BLD: 0.1 K/UL (ref 0–0.4)
EOSINOPHIL NFR BLD: 1 % (ref 0–5)
ERYTHROCYTE [DISTWIDTH] IN BLOOD BY AUTOMATED COUNT: 16.3 % (ref 11.6–14.5)
FLUAV AG NPH QL IA: NEGATIVE
FLUBV AG NOSE QL IA: NEGATIVE
GLOBULIN SER CALC-MCNC: 3.5 G/DL (ref 2–4)
GLUCOSE SERPL-MCNC: 95 MG/DL (ref 74–99)
HCT VFR BLD AUTO: 50.3 % (ref 36–48)
HGB BLD-MCNC: 17.4 G/DL (ref 13–16)
LYMPHOCYTES # BLD: 1.6 K/UL (ref 0.9–3.6)
LYMPHOCYTES NFR BLD: 19 % (ref 21–52)
MCH RBC QN AUTO: 28.1 PG (ref 24–34)
MCHC RBC AUTO-ENTMCNC: 34.6 G/DL (ref 31–37)
MCV RBC AUTO: 81.1 FL (ref 74–97)
MONOCYTES # BLD: 1 K/UL (ref 0.05–1.2)
MONOCYTES NFR BLD: 12 % (ref 3–10)
NEUTS SEG # BLD: 5.6 K/UL (ref 1.8–8)
NEUTS SEG NFR BLD: 67 % (ref 40–73)
PLATELET # BLD AUTO: 201 K/UL (ref 135–420)
PMV BLD AUTO: 9.4 FL (ref 9.2–11.8)
POTASSIUM SERPL-SCNC: 4.5 MMOL/L (ref 3.5–5.5)
PROT SERPL-MCNC: 7.4 G/DL (ref 6.4–8.2)
RBC # BLD AUTO: 6.2 M/UL (ref 4.7–5.5)
SODIUM SERPL-SCNC: 142 MMOL/L (ref 136–145)
WBC # BLD AUTO: 8.4 K/UL (ref 4.6–13.2)

## 2018-11-05 PROCEDURE — 85025 COMPLETE CBC W/AUTO DIFF WBC: CPT | Performed by: EMERGENCY MEDICINE

## 2018-11-05 PROCEDURE — 80053 COMPREHEN METABOLIC PANEL: CPT | Performed by: EMERGENCY MEDICINE

## 2018-11-05 PROCEDURE — 71046 X-RAY EXAM CHEST 2 VIEWS: CPT

## 2018-11-05 PROCEDURE — 87804 INFLUENZA ASSAY W/OPTIC: CPT | Performed by: EMERGENCY MEDICINE

## 2018-11-05 PROCEDURE — 99282 EMERGENCY DEPT VISIT SF MDM: CPT

## 2018-11-05 RX ORDER — AZITHROMYCIN 250 MG/1
TABLET, FILM COATED ORAL
Qty: 6 TAB | Refills: 0 | Status: SHIPPED | OUTPATIENT
Start: 2018-11-05 | End: 2018-11-10

## 2018-11-05 NOTE — ED PROVIDER NOTES
EMERGENCY DEPARTMENT HISTORY AND PHYSICAL EXAM    10:14 AM      Date: 11/5/2018  Patient Name: Judie Cid    History of Presenting Illness     Chief Complaint   Patient presents with    Cough         History Provided By: :Patient    Chief Complaint: Congestion  Duration:  3 days  Timing:  Constant  Location: Nose  Quality: NA  Severity: moderate  Modifying Factors: No modifying or aggravating factors were reported. Associated Symptoms: sore throat and coughing. Denies fever. Additional History (Context): 10:20 AM Judie Cid is a 67 y.o. male with h/o diabetes, hyperlipidemia, BPH, and bladder cancer who presents to ED complaining of constant moderate nasal congestion onset for 3 days. No modifying or aggravating factors were reported. Associated Sx include sore throat and cough. Denies fever. Reports that his sugars have been around 147. Reports no sick contacts. Denies any further complaints or symptoms at the moment. PCP: Patricia Kee MD        Current Outpatient Medications   Medication Sig Dispense Refill    azithromycin (ZITHROMAX Z-MARIA G) 250 mg tablet Take two tablets on day one then one tablet daily for the next 4 days. 6 Tab 0    tamsulosin (FLOMAX) 0.4 mg capsule Take 0.4 mg by mouth daily. Indications: SYMPTOMATIC BENIGN PROSTATIC HYPERPLASIA      aspirin delayed-release 81 mg tablet Take 81 mg by mouth daily.  atorvastatin (LIPITOR) 10 mg tablet Take 10 mg by mouth daily. Indications: hyperlipidemia      lisinopril (PRINIVIL, ZESTRIL) 2.5 mg tablet Take 2.5 mg by mouth daily.  metFORMIN (GLUCOPHAGE) 500 mg tablet Take 500 mg by mouth two (2) times daily (with meals). Indications: type 2 diabetes mellitus      finasteride (PROSCAR) 5 mg tablet Take 5 mg by mouth daily. Indications: SYMPTOMATIC BENIGN PROSTATIC HYPERPLASIA      esomeprazole (NEXIUM) 20 mg capsule Take  by mouth daily.  Indications: gastroesophageal reflux disease      FLUTICASONE FUROATE (FLONASE SENSIMIST NA) by Nasal route. Past History     Past Medical History:  Past Medical History:   Diagnosis Date    Arthritis     [facility]     BPH (benign prostatic hyperplasia)     BPH with urinary obstruction     Diabetes ([facility] 2017    Environmental allergies     GERD (gastroesophageal reflux disease)     Hyperlipidemia     Hypogonadism in male     [facility] bladder [facility]     Sleep apnea     CPAP       Past Surgical History:  Past Surgical History:   Procedure Laterality Date    HX COLONOSCOPY      HX ORTHOPAEDIC      KNEE SX    HX UROLOGICAL      BLADDER SX;     HX UROLOGICAL  2018    [facility]. Cysto, ProTouch laser enucleation of the prostate. Tissue morcellation. Dr. Meghan Sanchez       Family History:  Family History   Problem Relation Age of Onset    Diabetes Mother        Social History:  Social History     Tobacco Use    Smoking status: Former Smoker     Packs/day: 0.25     Years: 2.00     Pack years: 0.50     Last attempt to quit: 1967     Years since quittin.8    Smokeless tobacco: Never Used   Substance Use Topics    Alcohol use: Yes     Comment: Once or twice a year    Drug use: No       Allergies:  No Known Allergies      Review of Systems       Review of Systems   Constitutional: Negative. Negative for chills, diaphoresis and fever. HENT: Positive for congestion and sore throat. Negative for rhinorrhea. Eyes: Negative. Negative for pain, discharge and redness. Respiratory: Positive for cough. Negative for chest tightness, shortness of breath and wheezing. Cardiovascular: Negative. Negative for chest pain. Gastrointestinal: Negative. Negative for abdominal pain, constipation, diarrhea, nausea and vomiting. Genitourinary: Negative. Negative for dysuria, flank pain, frequency, hematuria and urgency. Musculoskeletal: Negative. Negative for back pain and neck pain. Skin: Negative. Negative for rash.    Neurological: Negative. Negative for syncope, weakness, numbness and headaches. Psychiatric/Behavioral: Negative. All other systems reviewed and are negative. Physical Exam     Visit Vitals  /89 (BP 1 Location: Left arm, BP Patient Position: At rest)   Pulse 90   Temp 98 °F (36.7 °C)   Resp 18   Ht 5' 10.5\" (1.791 m)   Wt 97.5 kg (215 lb)   SpO2 96%   BMI 30.41 kg/m²         Physical Exam   Constitutional: He appears well-developed and well-nourished. Non-toxic appearance. He does not have a sickly appearance. He does not appear ill. No distress. HENT:   Head: Normocephalic and atraumatic. Nose: Rhinorrhea present. Mouth/Throat: Uvula is midline and oropharynx is clear and moist. No oropharyngeal exudate. Congestion    Eyes: Conjunctivae and EOM are normal. Pupils are equal, round, and reactive to light. No scleral icterus. Neck: Trachea normal and normal range of motion. Neck supple. No hepatojugular reflux and no JVD present. No tracheal deviation present. No thyromegaly present. Cardiovascular: Normal rate, regular rhythm, S1 normal, S2 normal, normal heart sounds, intact distal pulses and normal pulses. Exam reveals no gallop, no S3 and no S4. No murmur heard. Pulses:       Radial pulses are 2+ on the right side, and 2+ on the left side. Dorsalis pedis pulses are 2+ on the right side, and 2+ on the left side. Pulmonary/Chest: Effort normal and breath sounds normal. No respiratory distress. He has no decreased breath sounds. He has no wheezes. He has no rhonchi. He has no rales. Abdominal: Soft. Normal appearance and bowel sounds are normal. He exhibits no distension and no mass. There is no hepatosplenomegaly. There is no tenderness. There is no rigidity, no rebound, no guarding, no CVA tenderness, no tenderness at McBurney's point and negative Sanders's sign. Musculoskeletal: Normal range of motion. Strength 5/5 throughout   Lymphadenopathy:        Head (right side):  No submental, no submandibular, no preauricular and no occipital adenopathy present. Head (left side): No submental, no submandibular, no preauricular and no occipital adenopathy present. He has no cervical adenopathy. Right: No supraclavicular adenopathy present. Left: No supraclavicular adenopathy present. Neurological: He is alert. He has normal strength and normal reflexes. He is not disoriented. No cranial nerve deficit or sensory deficit. Coordination and gait normal. GCS eye subscore is 4. GCS verbal subscore is 5. GCS motor subscore is 6. Grossly intact    Skin: Skin is warm, dry and intact. No rash noted. He is not diaphoretic. Psychiatric: He has a normal mood and affect. His speech is normal and behavior is normal. Judgment and thought content normal. Cognition and memory are normal.   Nursing note and vitals reviewed. Diagnostic Study Results     Labs -  Recent Results (from the past 12 hour(s))   INFLUENZA A & B AG (RAPID TEST)    Collection Time: 11/05/18 10:30 AM   Result Value Ref Range    Influenza A Antigen NEGATIVE  NEG      Influenza B Antigen NEGATIVE  NEG     CBC WITH AUTOMATED DIFF    Collection Time: 11/05/18 10:40 AM   Result Value Ref Range    WBC 8.4 4.6 - 13.2 K/uL    RBC 6.20 (H) 4.70 - 5.50 M/uL    HGB 17.4 (H) 13.0 - 16.0 g/dL    HCT 50.3 (H) 36.0 - 48.0 %    MCV 81.1 74.0 - 97.0 FL    MCH 28.1 24.0 - 34.0 PG    MCHC 34.6 31.0 - 37.0 g/dL    RDW 16.3 (H) 11.6 - 14.5 %    PLATELET 647 181 - 438 K/uL    MPV 9.4 9.2 - 11.8 FL    NEUTROPHILS 67 40 - 73 %    LYMPHOCYTES 19 (L) 21 - 52 %    MONOCYTES 12 (H) 3 - 10 %    EOSINOPHILS 1 0 - 5 %    BASOPHILS 1 0 - 2 %    ABS. NEUTROPHILS 5.6 1.8 - 8.0 K/UL    ABS. LYMPHOCYTES 1.6 0.9 - 3.6 K/UL    ABS. MONOCYTES 1.0 0.05 - 1.2 K/UL    ABS. EOSINOPHILS 0.1 0.0 - 0.4 K/UL    ABS.  BASOPHILS 0.0 0.0 - 0.1 K/UL    DF AUTOMATED     METABOLIC PANEL, COMPREHENSIVE    Collection Time: 11/05/18 10:40 AM   Result Value Ref Range    Sodium 142 136 - 145 mmol/L    Potassium 4.5 3.5 - 5.5 mmol/L    Chloride 106 100 - 108 mmol/L    CO2 27 21 - 32 mmol/L    Anion gap 9 3.0 - 18 mmol/L    Glucose 95 74 - 99 mg/dL    BUN 15 7.0 - 18 MG/DL    Creatinine 1.38 (H) 0.6 - 1.3 MG/DL    BUN/Creatinine ratio 11 (L) 12 - 20      GFR est AA >60 >60 ml/min/1.73m2    GFR est non-AA 51 (L) >60 ml/min/1.73m2    Calcium 8.5 8.5 - 10.1 MG/DL    Bilirubin, total 0.4 0.2 - 1.0 MG/DL    ALT (SGPT) 45 16 - 61 U/L    AST (SGOT) 29 15 - 37 U/L    Alk. phosphatase 61 45 - 117 U/L    Protein, total 7.4 6.4 - 8.2 g/dL    Albumin 3.9 3.4 - 5.0 g/dL    Globulin 3.5 2.0 - 4.0 g/dL    A-G Ratio 1.1 0.8 - 1.7         Radiologic Studies -   XR CHEST PA LAT    (Results Pending)   No results found. Medical Decision Making   Provider Notes (Medical Decision Making):  MDM  Number of Diagnoses or Management Options  Upper respiratory tract infection, unspecified type:   Diagnosis management comments: Influenza  Pneumonia  Bronchitis       I am the first provider for this patient. I reviewed the vital signs, available nursing notes, past medical history, past surgical history, family history and social history. Vital Signs-Reviewed the patient's vital signs. Records Reviewed: Nursing Notes and Old Medical Records (Time of Review: 10:14 AM)    ED Course: Progress Notes, Reevaluation, and Consults:  Labs essentially normal. Chest X-Ray showed No acute process. Influenza negative. 10:14 AM 11/5/2018    Diagnosis       I have reassessed the patient. Patient is feeling better. Patient will be prescribed Zithromax Z-Dveang. Patient was discharged in stable condition. Patient is to return to emergency department if any new or worsening condition. Clinical Impression:   1.  Upper respiratory tract infection, unspecified type        Disposition: DC    Follow-up Information     Follow up With Specialties Details Why [place] [address] EMERGENCY DEPT Emergency Medicine Go to As needed, If symptoms worsen [address] Cori Chand MD [facility] Schedule an appointment as soon as possible for a visit in 2 days For follow up [facility]  [phone]             _______________________________    Attestations:  [id number] [facility] acting as a scribe for and in the presence of Jewel Santiago DO      November 05, 2018 at 10:14 AM       Provider Attestation:      I personally performed the services described in the documentation, reviewed the documentation, as recorded by the scribe in my presence, and it accurately and completely records my words and actions.  November 05, 2018 at 10:14 AM - Lynda Olvera DO    _______________________________

## 2018-11-05 NOTE — DISCHARGE INSTRUCTIONS

## 2019-09-03 PROBLEM — E11.9 DIABETES (HCC): Status: ACTIVE | Noted: 2017-04-27

## (undated) DEVICE — CATH URETH FOL 3W MED 22FRX30 --

## (undated) DEVICE — SYRINGE MED 20ML STD CLR PLAS LUERLOCK TIP N CTRL DISP

## (undated) DEVICE — Device

## (undated) DEVICE — SOLUTION IRRIG 1000ML H2O STRL BLT

## (undated) DEVICE — STATLOCKTM STABILIZATION DEVICES (PICC PLUS, CRESCENT FOAM PAD, FIXED POST RETAINER): Brand: STATLOCK

## (undated) DEVICE — BAG DRAIN URIN 2000ML LF STRL -- CONVERT TO ITEM 363123

## (undated) DEVICE — CONTAINER DRN 20L DISP FLUIDRN LLS

## (undated) DEVICE — PLUG CATH CAP URETH FOL STRL --

## (undated) DEVICE — SPONGE GZ W4XL4IN COT 12 PLY TYP VII WVN C FLD DSGN

## (undated) DEVICE — SOLUTION IV 1000ML 0.9% SOD CHL

## (undated) DEVICE — SOLUTION IRRIG 3000ML 0.9% SOD CHL FLX CONT 0797208] ICU MEDICAL INC]

## (undated) DEVICE — BAG DRNGE NONSTERILE W/ SUCT HOSE CYSTO/UROLOGICAL FOR GE

## (undated) DEVICE — SOLUTION SCRB 4OZ 10% PVP I POVIDONE IOD TOP PAINT EXIDINE

## (undated) DEVICE — ROTATIONS-MORCELLATOR Ø 4.8MM WL 335MM  FOR MORCESCOPE, FOR MORCELLATION FOLLOWING LASER PROSTATE ENUCLEATION, STERILE: Brand: PIRANHA

## (undated) DEVICE — SYR IRR CATH TIP LR ADPT 70ML -- CONVERT TO ITEM 363120

## (undated) DEVICE — SLEEVE COMPR L THGH LEN WARP